# Patient Record
Sex: MALE | Race: WHITE | NOT HISPANIC OR LATINO | Employment: OTHER | URBAN - METROPOLITAN AREA
[De-identification: names, ages, dates, MRNs, and addresses within clinical notes are randomized per-mention and may not be internally consistent; named-entity substitution may affect disease eponyms.]

---

## 2024-01-01 ENCOUNTER — HOSPITAL ENCOUNTER (EMERGENCY)
Facility: CLINIC | Age: 69
Discharge: HOME | End: 2024-05-03
Attending: EMERGENCY MEDICINE | Admitting: EMERGENCY MEDICINE
Payer: COMMERCIAL

## 2024-01-01 ENCOUNTER — APPOINTMENT (OUTPATIENT)
Dept: GENERAL RADIOLOGY | Facility: CLINIC | Age: 69
End: 2024-01-01
Attending: EMERGENCY MEDICINE
Payer: COMMERCIAL

## 2024-01-01 ENCOUNTER — APPOINTMENT (OUTPATIENT)
Dept: CT IMAGING | Facility: CLINIC | Age: 69
End: 2024-01-01
Attending: EMERGENCY MEDICINE
Payer: COMMERCIAL

## 2024-01-01 ENCOUNTER — HEALTH MAINTENANCE LETTER (OUTPATIENT)
Age: 69
End: 2024-01-01

## 2024-01-01 VITALS
HEART RATE: 100 BPM | OXYGEN SATURATION: 99 % | WEIGHT: 230 LBS | HEIGHT: 74 IN | SYSTOLIC BLOOD PRESSURE: 160 MMHG | DIASTOLIC BLOOD PRESSURE: 92 MMHG | RESPIRATION RATE: 18 BRPM | BODY MASS INDEX: 29.52 KG/M2 | TEMPERATURE: 98.3 F

## 2024-01-01 DIAGNOSIS — S62.617A CLOSED DISPLACED FRACTURE OF PROXIMAL PHALANX OF LEFT LITTLE FINGER, INITIAL ENCOUNTER: ICD-10-CM

## 2024-01-01 DIAGNOSIS — S01.01XA SCALP LACERATION, INITIAL ENCOUNTER: ICD-10-CM
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
## 2024-01-01 PROCEDURE — 26720 TREAT FINGER FRACTURE EACH: CPT | Mod: F4

## 2024-01-01 PROCEDURE — 12001 RPR S/N/AX/GEN/TRNK 2.5CM/<: CPT | Mod: 59

## 2024-01-01 PROCEDURE — 99284 EMERGENCY DEPT VISIT MOD MDM: CPT | Mod: 25

## 2024-01-01 PROCEDURE — 70450 CT HEAD/BRAIN W/O DYE: CPT

## 2024-01-01 PROCEDURE — 73130 X-RAY EXAM OF HAND: CPT | Mod: LT

## 2024-01-01 RX ORDER — LIDOCAINE HYDROCHLORIDE AND EPINEPHRINE 10; 10 MG/ML; UG/ML
INJECTION, SOLUTION INFILTRATION; PERINEURAL
Status: DISCONTINUED
Start: 2024-01-01 | End: 2024-01-01 | Stop reason: HOSPADM

## 2024-01-01 ASSESSMENT — COLUMBIA-SUICIDE SEVERITY RATING SCALE - C-SSRS
1. IN THE PAST MONTH, HAVE YOU WISHED YOU WERE DEAD OR WISHED YOU COULD GO TO SLEEP AND NOT WAKE UP?: NO
6. HAVE YOU EVER DONE ANYTHING, STARTED TO DO ANYTHING, OR PREPARED TO DO ANYTHING TO END YOUR LIFE?: NO
2. HAVE YOU ACTUALLY HAD ANY THOUGHTS OF KILLING YOURSELF IN THE PAST MONTH?: NO

## 2024-01-01 ASSESSMENT — ACTIVITIES OF DAILY LIVING (ADL)
ADLS_ACUITY_SCORE: 35
ADLS_ACUITY_SCORE: 35

## 2024-05-03 NOTE — ED TRIAGE NOTES
Patient arrives after a fall. Patient was trying to sit down when he missed the chair and fell. No thinners. Of note, L hand is swollen and bruised from another fall yesterday. Wife reports mentation and speech is normal for patient.

## 2024-05-03 NOTE — DISCHARGE INSTRUCTIONS
What do you do next:   Continue your home medications unless we have specifically changed them  If medications were prescribed today, take these as directed.  You can use over-the-counter acetaminophen (Tylenol ) for fever or pain control as applicable to your visit today.  Acetaminophen (Tylenol): Take 500 to 1000 mg by mouth every 6 hours as needed for fever or pain.  Do not take more than 4000 total milligrams of acetaminophen-containing products in a 24-hour timeframe.  Follow the wound care instructions in your discharge paperwork.  Keep the splint on your left hand dry.  If it gets wet you should be seen in the clinic or in the emergency department for reapplication of your splint.  Staples should be removed in 5 days.  Follow up as indicated below    When do you return: Review your discharge papers for specifics on reasons to return.    Thank you for allowing us to care for you today.

## 2024-05-03 NOTE — ED PROVIDER NOTES
"Emergency Department Note      History of Present Illness     Chief Complaint:  Fall       HPI   Sage Loera is a 68 year old male with a history of progressive supranuclear palsy, who presents after a fall.  The patient and his wife note that the patient's fell yesterday and today.    Yesterday, the patient was lifting a heavy dining room chair to sit at a table and fell over sideways with a chair.  The chair then landed on his left hand and it has been bruised and swollen since.  The patient declined to be seen yesterday for this but does note some degree of discomfort in the hand.  It seems more bruised today.  Family notes a history of Dupuytren's contracture and there is always some degree of flexion of the left fifth finger.    Today, the patient states that he was trying to sit down into a chair but missed it, falling backwards and hitting the right posterior side of his head.  He denies loss of consciousness.  The patient notes that he does take a 325 mg aspirin daily.  They deny stroke specifically but states that the patient has what sounds like narrow blood vessels into the head and does have a prior history of smoking (he does not smoke currently).    No nausea/vomiting    Independent Historian:    Wife as detailed above.    Review of External Notes  PT DC Summary 2/22/24: Patient has met goals for stat plan of care and felt comfortable discharging from skilled nursing at this time    Past Medical History   Medical History, Surgical History, Problem List, and Medications  Reviewed in Epic    Physical Exam   Patient Vitals for the past 24 hrs:   BP Temp Pulse Resp SpO2 Height Weight   05/03/24 1428 -- 98.3  F (36.8  C) -- -- -- -- --   05/03/24 1427 (!) 160/92 -- 100 18 99 % 1.88 m (6' 2\") 104.3 kg (230 lb)       Physical Exam  Constitutional: Vital signs reviewed as above  Head: Right posterior scalp injury.   Eyes: PEERL, EOMI B/L  Neck: No JVD noted. FROM   Cardiovascular: normal " rate.  Pulmonary/Chest: No respiratory distress.  Musculoskeletal/Extremities: There is flexion noted at the left fifth finger.  There is notable bruising and swelling around that digit as well as over the hand at the region of the fourth and fifth metacarpals.  There is tenderness to the distal fourth and fifth (fifth greater than fourth) metacarpal.  Neurological: Alert. Slow speech (baseline mentation per family)  Skin: Skin is warm and dry. There is no diaphoresis noted. 2.5 cm laceration of the right posterior scalp  Psychiatric: The patient appears calm.     Diagnostics     Laboratory: Imaging:   Labs Ordered and Resulted from Time of ED Arrival to Time of ED Departure - No data to display  XR Hand Left G/E 3 Views   Preliminary Result   IMPRESSION: Comminuted intra-articular fracture at the base of the   proximal phalanx of the pinky finger. No significant angulation. There   is a couple millimeters of displacement. 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       Head CT w/o contrast   Final Result   IMPRESSION: Posterior right parietal scalp hematoma/laceration.   Diffuse cerebral volume loss and cerebral white matter changes   consistent with chronic small vessel ischemic disease. No evidence for   acute intracranial pathology.            CRISTOBAL LABOY MD            SYSTEM ID:  [id number]            Independent Interpretation  See ED course    ED Course    Medications Administered  Medications   lidocaine 1% with EPINEPHrine 1:100,000 1 %-[id number] injection (has no administration in time range)       Procedures  Procedures     Laceration Repair      Procedure: Laceration Repair    Indication: Laceration    Consent: Verbal    Tetanus status reviewed: Last 1/25/2023    Location: Right Posterior scalp    Length: 2.5 cm    Preparation: Irrigation with Sterile Saline.    Anesthesia/Sedation: Lidocaine with Epinephrine - 1%      Treatment/Exploration: Wound explored, no foreign bodies found      Closure: The wound was closed with  4 staples.    Patient Status: The patient tolerated the procedure well: Yes. There were no complications.      Splint Placement     Procedure: Splint Placement     Indication: Fracture    Consent: Verbal     Location: Left 5th finger    Preparation: Wounds were cleansed and dressed with a non-adherent bandage     Procedure detail:   Splint was applied by Myself  Splint type: Ulnar gutter   Splint materilal: Fiberglass  After placement I checked and adjusted the fit as needed to ensure proper positioning/fit   Sensation and circulation are intact after splint placement     Patient Status: The patient tolerated the procedure well: Yes. There were no complications.      Discussion of Management  See ED Course    Social Determinants of Health adding to complexity of care  None    ED Course  ED Course as of 05/03/24 1556   Fri May 03, 2024   1441 Initial evaluation.   1525 XR Hand Left G/E 3 Views  My interpretation: proximal 5th finger fracture.   1526 Left message with [facility] hand trauma clinic.       Medical Decision Making / Diagnosis   CMS Diagnoses: None    Code Status: No Order    MIPS     None    Medical Decision Making:  Sage Loera is a 68 year old male presented to the Emergency Department after 2 falls.  Given the time of the injury, the wound was felt amenable to primary closure.  After adequate anesthesia was obtained, the wound was thoroughly irrigated and examined.  There is no evidence of muscular, tendon, or bony involvement at this time, nor signs or symptoms of neurovascular compromise.  Additionally, given the mechanism of injury and examination, suspicion for a foreign body was low, but given is ASA use, CT imaging was done (fortunately negative for ICH or skull fracture).     Additionally, the patient had a fracture of the proximal aspect of his left fifth finger.  He was splinted though this was complicated by the fact that he has a Dupuytren's contracture  and has some flexion at baseline due to that.  He was splinted in the best position I could get him in.  I placed a call to the [facility] hand trauma clinic for follow-up as well.    We discussed appropriate wound care instructions  Will plan for suture/staple removal in 5 days.  Patient was encouraged to return to the ER or follow-up with PCP in the meantime should any new or troubling symptoms develop.     Critical Care:  None.    Disposition:  See ED Course and MDM    ICD-10 Codes:    ICD-10-CM    1. Fall, initial encounter  W19.XXXA       2. Scalp laceration, initial encounter  S01.01XA       3. Closed displaced fracture of proximal phalanx of left little finger, initial encounter  S62.617A            Discharge Medications:  New Prescriptions    No medications on file        5/3/2024   Nikita Rodriguez DO     [employer]                    Nikita Rodriguez DO  05/03/24 1556